# Patient Record
Sex: MALE | Race: BLACK OR AFRICAN AMERICAN | NOT HISPANIC OR LATINO | URBAN - METROPOLITAN AREA
[De-identification: names, ages, dates, MRNs, and addresses within clinical notes are randomized per-mention and may not be internally consistent; named-entity substitution may affect disease eponyms.]

---

## 2024-11-21 ENCOUNTER — HOSPITAL ENCOUNTER (EMERGENCY)
Facility: HOSPITAL | Age: 54
Discharge: HOME | End: 2024-11-21
Attending: EMERGENCY MEDICINE

## 2024-11-21 VITALS
SYSTOLIC BLOOD PRESSURE: 153 MMHG | RESPIRATION RATE: 18 BRPM | WEIGHT: 176.38 LBS | TEMPERATURE: 98 F | OXYGEN SATURATION: 99 % | HEART RATE: 87 BPM | DIASTOLIC BLOOD PRESSURE: 87 MMHG

## 2024-11-21 DIAGNOSIS — M79.89 LEG SWELLING: ICD-10-CM

## 2024-11-21 DIAGNOSIS — L84 HEEL CALLUS: Primary | ICD-10-CM

## 2024-11-21 DIAGNOSIS — R22.42 LOCALIZED SWELLING OF LEFT LOWER LEG: ICD-10-CM

## 2024-11-21 PROCEDURE — 82962 GLUCOSE BLOOD TEST: CPT

## 2024-11-21 PROCEDURE — 99284 EMERGENCY DEPT VISIT MOD MDM: CPT | Mod: 25

## 2024-11-21 RX ORDER — GABAPENTIN 300 MG/1
300 CAPSULE ORAL 3 TIMES DAILY
Qty: 180 CAPSULE | Refills: 0 | Status: SHIPPED | OUTPATIENT
Start: 2024-11-21 | End: 2025-01-20

## 2024-11-21 NOTE — ED PROVIDER NOTES
Encounter Date: 11/21/2024       History     Chief Complaint   Patient presents with    Diabetic Foot Ulcer     PT SEEN BY PCP FOR DM W RT FOOT ULCER AND EDEMA TO LT LEG  FOR MONTHS.  .       Patient with a history of diabetes mellitus and hypertension presents emergency department were left leg swelling and pain in his lateral left thigh for several weeks.  Has been seen by his doctor was told to use compression stockings.  He was has a chronic ulcer on the heel of his right foot.  No recent fevers illnesses cough chest pain shortness of breath abdominal pain nausea diarrhea.  No other systemic complaints no history of blood clots in his lungs or legs      Review of patient's allergies indicates:  No Known Allergies  Past Medical History:   Diagnosis Date    Diabetes mellitus     Hypertension      History reviewed. No pertinent surgical history.  No family history on file.  Social History     Tobacco Use    Smoking status: Former     Types: Cigarettes    Smokeless tobacco: Never   Substance Use Topics    Alcohol use: Yes     Comment: OCCA.    Drug use: Not Currently     Review of Systems   Musculoskeletal:         Left leg swelling  Right foot ulcer   All other systems reviewed and are negative.      Physical Exam     Initial Vitals [11/21/24 1250]   BP Pulse Resp Temp SpO2   (!) 185/95 102 18 97.9 °F (36.6 °C) 99 %      MAP       --         Physical Exam    Constitutional: He appears well-developed and well-nourished.   HENT:   Head: Normocephalic and atraumatic.   Eyes: EOM are normal. Pupils are equal, round, and reactive to light.   Neck:   Normal range of motion.  Cardiovascular:  Normal rate, regular rhythm and normal heart sounds.           No murmur heard.  Pulmonary/Chest: Breath sounds normal. No respiratory distress. He has no wheezes. He has no rales.   Abdominal: Abdomen is soft. Bowel sounds are normal. He exhibits no distension. There is no abdominal tenderness. There is no rebound.    Musculoskeletal:         General: Normal range of motion.      Cervical back: Normal range of motion.      Comments: Trace edema bilateral left lower extremity.  Mild grossly larger left leg versus right.  Strong 2+ DP pulse bilaterally no mottling equal warmth bilaterally.  Old callus ulceration proximally 3 x 3 cm on the heel of right foot with no surrounding erythema warmth or purulence     Neurological: He is alert and oriented to person, place, and time.   Psychiatric: He has a normal mood and affect.         ED Course   Procedures  Labs Reviewed - No data to display       Imaging Results    None          Medications - No data to display  Medical Decision Making                        Medical Decision Making:   Initial Assessment:   The chest pain shortness of breath recent fevers illnesses.  Mild trace edema left lower extremity with no signs of infection with good strong 2+ DP pulse no signs of arterial compromise.  Ultrasound in progress.  Patient has callus not ulceration right heel.  No signs of warmth or infection.  He has been a chronic issue many months.  Patient also states that he was tingling has been going on for over a year in his lower extremities.  Patient was not on gabapentin.  Workup progress  Differential Diagnosis:   Heel callus, foreign body, ulceration, infection, osteomyelitis, DVT venous insufficiency, Baker cyst, venous insufficiency, CHF normal cellulitis  ED Management:  DVT study negative.  Advised patient to wear compression stocking.  Patient stated that he has been having tingling in his bilateral lower extremities he was diabetic likely neuropathic pain will provide gabapentin with primary care follow up.  Patient understands and agrees with plan of care             Clinical Impression:  Final diagnoses:  [M79.89] Leg swelling  [L84] Heel callus (Primary)  [R22.42] Localized swelling of left lower leg          ED Disposition Condition    Discharge Stable          ED  Prescriptions       Medication Sig Dispense Start Date End Date Auth. Provider    gabapentin (NEURONTIN) 300 MG capsule Take 1 capsule (300 mg total) by mouth 3 (three) times daily. 180 capsule 11/21/2024 1/20/2025 Andre Wu MD          Follow-up Information    None          Andre Wu MD  11/21/24 6137

## 2024-11-22 LAB — POCT GLUCOSE: 221 MG/DL (ref 70–110)
